# Patient Record
Sex: MALE | Race: WHITE | NOT HISPANIC OR LATINO | Employment: UNEMPLOYED | URBAN - METROPOLITAN AREA
[De-identification: names, ages, dates, MRNs, and addresses within clinical notes are randomized per-mention and may not be internally consistent; named-entity substitution may affect disease eponyms.]

---

## 2019-10-01 ENCOUNTER — APPOINTMENT (OUTPATIENT)
Dept: CT IMAGING | Facility: HOSPITAL | Age: 49
End: 2019-10-01

## 2019-10-01 ENCOUNTER — HOSPITAL ENCOUNTER (EMERGENCY)
Facility: HOSPITAL | Age: 49
Discharge: HOME | End: 2019-10-02
Attending: EMERGENCY MEDICINE | Admitting: EMERGENCY MEDICINE

## 2019-10-01 ENCOUNTER — APPOINTMENT (OUTPATIENT)
Dept: GENERAL RADIOLOGY | Facility: HOSPITAL | Age: 49
End: 2019-10-01

## 2019-10-01 DIAGNOSIS — F19.10 POLYSUBSTANCE ABUSE (HCC): ICD-10-CM

## 2019-10-01 DIAGNOSIS — Z86.79 HISTORY OF HYPERTENSION: ICD-10-CM

## 2019-10-01 DIAGNOSIS — R07.9 CHEST PAIN, UNSPECIFIED TYPE: ICD-10-CM

## 2019-10-01 DIAGNOSIS — F10.929 ALCOHOLIC INTOXICATION WITH COMPLICATION (HCC): Primary | ICD-10-CM

## 2019-10-01 DIAGNOSIS — E87.1 HYPONATREMIA: ICD-10-CM

## 2019-10-01 DIAGNOSIS — Z86.79 HISTORY OF CONGESTIVE HEART FAILURE: ICD-10-CM

## 2019-10-01 DIAGNOSIS — E87.6 HYPOKALEMIA: ICD-10-CM

## 2019-10-01 LAB
ALBUMIN SERPL-MCNC: 3.8 G/DL (ref 3.5–5.2)
ALBUMIN/GLOB SERPL: 0.8 G/DL
ALP SERPL-CCNC: 105 U/L (ref 39–117)
ALT SERPL W P-5'-P-CCNC: 24 U/L (ref 1–41)
AMPHET+METHAMPHET UR QL: NEGATIVE
AMPHETAMINES UR QL: NEGATIVE
ANION GAP SERPL CALCULATED.3IONS-SCNC: 20 MMOL/L (ref 5–15)
AST SERPL-CCNC: 30 U/L (ref 1–40)
BARBITURATES UR QL SCN: NEGATIVE
BASOPHILS # BLD AUTO: 0.05 10*3/MM3 (ref 0–0.2)
BASOPHILS NFR BLD AUTO: 0.5 % (ref 0–1.5)
BENZODIAZ UR QL SCN: NEGATIVE
BILIRUB SERPL-MCNC: 0.3 MG/DL (ref 0.2–1.2)
BILIRUB UR QL STRIP: NEGATIVE
BUN BLD-MCNC: 6 MG/DL (ref 6–20)
BUN BLDA-MCNC: 4 MG/DL (ref 8–26)
BUN/CREAT SERPL: 8.1 (ref 7–25)
BUPRENORPHINE SERPL-MCNC: NEGATIVE NG/ML
CA-I BLDA-SCNC: 1.05 MMOL/L (ref 1.2–1.32)
CALCIUM SPEC-SCNC: 9.2 MG/DL (ref 8.6–10.5)
CANNABINOIDS SERPL QL: NEGATIVE
CHLORIDE BLDA-SCNC: 88 MMOL/L (ref 98–109)
CHLORIDE SERPL-SCNC: 79 MMOL/L (ref 98–107)
CK SERPL-CCNC: 92 U/L (ref 20–200)
CLARITY UR: CLEAR
CO2 BLDA-SCNC: 36 MMOL/L (ref 24–29)
CO2 SERPL-SCNC: 28 MMOL/L (ref 22–29)
COCAINE UR QL: POSITIVE
COLOR UR: YELLOW
CREAT BLD-MCNC: 0.74 MG/DL (ref 0.76–1.27)
CREAT BLDA-MCNC: 0.7 MG/DL (ref 0.6–1.3)
DEPRECATED RDW RBC AUTO: 55.4 FL (ref 37–54)
EOSINOPHIL # BLD AUTO: 0.07 10*3/MM3 (ref 0–0.4)
EOSINOPHIL NFR BLD AUTO: 0.6 % (ref 0.3–6.2)
ERYTHROCYTE [DISTWIDTH] IN BLOOD BY AUTOMATED COUNT: 20.1 % (ref 12.3–15.4)
ETHANOL BLD-MCNC: 156 MG/DL (ref 0–10)
GFR SERPL CREATININE-BSD FRML MDRD: 112 ML/MIN/1.73
GLOBULIN UR ELPH-MCNC: 4.9 GM/DL
GLUCOSE BLD-MCNC: 134 MG/DL (ref 65–99)
GLUCOSE BLDC GLUCOMTR-MCNC: 100 MG/DL (ref 70–130)
GLUCOSE UR STRIP-MCNC: NEGATIVE MG/DL
HCT VFR BLD AUTO: 51 % (ref 37.5–51)
HCT VFR BLDA CALC: 52 % (ref 38–51)
HGB BLD-MCNC: 17.1 G/DL (ref 13–17.7)
HGB BLDA-MCNC: 17.7 G/DL (ref 12–17)
HGB UR QL STRIP.AUTO: NEGATIVE
HOLD SPECIMEN: NORMAL
HOLD SPECIMEN: NORMAL
IMM GRANULOCYTES # BLD AUTO: 0.04 10*3/MM3 (ref 0–0.05)
IMM GRANULOCYTES NFR BLD AUTO: 0.4 % (ref 0–0.5)
KETONES UR QL STRIP: NEGATIVE
LEUKOCYTE ESTERASE UR QL STRIP.AUTO: NEGATIVE
LIPASE SERPL-CCNC: 31 U/L (ref 13–60)
LYMPHOCYTES # BLD AUTO: 2.25 10*3/MM3 (ref 0.7–3.1)
LYMPHOCYTES NFR BLD AUTO: 20.3 % (ref 19.6–45.3)
MAGNESIUM SERPL-MCNC: 2.3 MG/DL (ref 1.6–2.6)
MCH RBC QN AUTO: 26.2 PG (ref 26.6–33)
MCHC RBC AUTO-ENTMCNC: 33.5 G/DL (ref 31.5–35.7)
MCV RBC AUTO: 78.2 FL (ref 79–97)
METHADONE UR QL SCN: NEGATIVE
MONOCYTES # BLD AUTO: 1 10*3/MM3 (ref 0.1–0.9)
MONOCYTES NFR BLD AUTO: 9 % (ref 5–12)
NEUTROPHILS # BLD AUTO: 7.66 10*3/MM3 (ref 1.7–7)
NEUTROPHILS NFR BLD AUTO: 69.2 % (ref 42.7–76)
NITRITE UR QL STRIP: NEGATIVE
NRBC BLD AUTO-RTO: 0 /100 WBC (ref 0–0.2)
NT-PROBNP SERPL-MCNC: 138.2 PG/ML (ref 5–450)
OPIATES UR QL: NEGATIVE
OXYCODONE UR QL SCN: NEGATIVE
PCP UR QL SCN: NEGATIVE
PH UR STRIP.AUTO: 6.5 [PH] (ref 5–8)
PLATELET # BLD AUTO: 331 10*3/MM3 (ref 140–450)
PMV BLD AUTO: 9.4 FL (ref 6–12)
POTASSIUM BLD-SCNC: 2.5 MMOL/L (ref 3.5–5.2)
POTASSIUM BLDA-SCNC: 3.9 MMOL/L (ref 3.5–4.9)
PROPOXYPH UR QL: NEGATIVE
PROT SERPL-MCNC: 8.7 G/DL (ref 6–8.5)
PROT UR QL STRIP: NEGATIVE
RBC # BLD AUTO: 6.52 10*6/MM3 (ref 4.14–5.8)
SALICYLATES SERPL-MCNC: <0.3 MG/DL
SODIUM BLD-SCNC: 127 MMOL/L (ref 136–145)
SODIUM BLDA-SCNC: 133 MMOL/L (ref 138–146)
SP GR UR STRIP: 1.01 (ref 1–1.03)
TRICYCLICS UR QL SCN: NEGATIVE
TROPONIN T SERPL-MCNC: <0.01 NG/ML (ref 0–0.03)
TROPONIN T SERPL-MCNC: <0.01 NG/ML (ref 0–0.03)
UROBILINOGEN UR QL STRIP: NORMAL
WBC NRBC COR # BLD: 11.07 10*3/MM3 (ref 3.4–10.8)
WHOLE BLOOD HOLD SPECIMEN: NORMAL
WHOLE BLOOD HOLD SPECIMEN: NORMAL

## 2019-10-01 PROCEDURE — 84484 ASSAY OF TROPONIN QUANT: CPT | Performed by: EMERGENCY MEDICINE

## 2019-10-01 PROCEDURE — 85025 COMPLETE CBC W/AUTO DIFF WBC: CPT

## 2019-10-01 PROCEDURE — 80047 BASIC METABLC PNL IONIZED CA: CPT

## 2019-10-01 PROCEDURE — 80307 DRUG TEST PRSMV CHEM ANLYZR: CPT | Performed by: NURSE PRACTITIONER

## 2019-10-01 PROCEDURE — 80053 COMPREHEN METABOLIC PANEL: CPT | Performed by: EMERGENCY MEDICINE

## 2019-10-01 PROCEDURE — 81003 URINALYSIS AUTO W/O SCOPE: CPT | Performed by: NURSE PRACTITIONER

## 2019-10-01 PROCEDURE — 96365 THER/PROPH/DIAG IV INF INIT: CPT

## 2019-10-01 PROCEDURE — 83690 ASSAY OF LIPASE: CPT | Performed by: NURSE PRACTITIONER

## 2019-10-01 PROCEDURE — 99284 EMERGENCY DEPT VISIT MOD MDM: CPT

## 2019-10-01 PROCEDURE — 93005 ELECTROCARDIOGRAM TRACING: CPT | Performed by: EMERGENCY MEDICINE

## 2019-10-01 PROCEDURE — 80307 DRUG TEST PRSMV CHEM ANLYZR: CPT | Performed by: EMERGENCY MEDICINE

## 2019-10-01 PROCEDURE — 93005 ELECTROCARDIOGRAM TRACING: CPT

## 2019-10-01 PROCEDURE — 85014 HEMATOCRIT: CPT

## 2019-10-01 PROCEDURE — 83735 ASSAY OF MAGNESIUM: CPT | Performed by: NURSE PRACTITIONER

## 2019-10-01 PROCEDURE — 71045 X-RAY EXAM CHEST 1 VIEW: CPT

## 2019-10-01 PROCEDURE — 83880 ASSAY OF NATRIURETIC PEPTIDE: CPT | Performed by: EMERGENCY MEDICINE

## 2019-10-01 PROCEDURE — 70450 CT HEAD/BRAIN W/O DYE: CPT

## 2019-10-01 PROCEDURE — 80306 DRUG TEST PRSMV INSTRMNT: CPT | Performed by: NURSE PRACTITIONER

## 2019-10-01 PROCEDURE — 25010000003 POTASSIUM CHLORIDE 10 MEQ/100ML SOLUTION: Performed by: NURSE PRACTITIONER

## 2019-10-01 PROCEDURE — 82550 ASSAY OF CK (CPK): CPT | Performed by: NURSE PRACTITIONER

## 2019-10-01 RX ORDER — POTASSIUM CHLORIDE 750 MG/1
40 CAPSULE, EXTENDED RELEASE ORAL ONCE
Status: COMPLETED | OUTPATIENT
Start: 2019-10-01 | End: 2019-10-01

## 2019-10-01 RX ORDER — POTASSIUM CHLORIDE 7.45 MG/ML
10 INJECTION INTRAVENOUS ONCE
Status: COMPLETED | OUTPATIENT
Start: 2019-10-01 | End: 2019-10-01

## 2019-10-01 RX ORDER — POTASSIUM CHLORIDE 1.5 G/1.77G
40 POWDER, FOR SOLUTION ORAL ONCE
Status: DISCONTINUED | OUTPATIENT
Start: 2019-10-01 | End: 2019-10-01

## 2019-10-01 RX ORDER — SODIUM CHLORIDE 0.9 % (FLUSH) 0.9 %
10 SYRINGE (ML) INJECTION AS NEEDED
Status: DISCONTINUED | OUTPATIENT
Start: 2019-10-01 | End: 2019-10-02 | Stop reason: HOSPADM

## 2019-10-01 RX ADMIN — POTASSIUM CHLORIDE 40 MEQ: 750 CAPSULE, EXTENDED RELEASE ORAL at 22:05

## 2019-10-01 RX ADMIN — SODIUM CHLORIDE 1000 ML: 9 INJECTION, SOLUTION INTRAVENOUS at 18:58

## 2019-10-01 RX ADMIN — POTASSIUM CHLORIDE 40 MEQ: 750 CAPSULE, EXTENDED RELEASE ORAL at 20:32

## 2019-10-01 RX ADMIN — POTASSIUM CHLORIDE 10 MEQ: 7.46 INJECTION, SOLUTION INTRAVENOUS at 18:58

## 2019-10-02 VITALS
OXYGEN SATURATION: 97 % | TEMPERATURE: 98.3 F | HEIGHT: 72 IN | HEART RATE: 76 BPM | WEIGHT: 315 LBS | BODY MASS INDEX: 42.66 KG/M2 | SYSTOLIC BLOOD PRESSURE: 132 MMHG | DIASTOLIC BLOOD PRESSURE: 75 MMHG | RESPIRATION RATE: 20 BRPM

## 2019-10-02 NOTE — DISCHARGE INSTRUCTIONS
Continue to take your medication as previously prescribed.  As we discussed, your sodium and potassium levels were initially significantly decreased.  Follow-up with your primary care physician in the near future to recheck these and confirm that they remain normal.  Follow-up with the [place], the [facility], and your primary care physician for further evaluation and management of your anxiety and substance abuse.  Return to the emergency department if symptoms worsen or other concerns arise.

## 2022-09-16 NOTE — ED PROVIDER NOTES
Spoke with patient, and notified him that we were following up to ensure he is taking the antibiotics, and understood these are to help with preventing/treating any wound infections he may have. Pt stated \"got it, will do, and understood\" to this message. No further questions at this time, and pt then hung up on this caller. ----- Message from Reginaldo Alford sent at 9/15/2022 12:17 PM CDT -----  Regarding: RE: Patient not wanting to take meds  Thanks Fidencio Main, appreciate it! Team- can we please call and make sure he's taking the antibiotics? Thanks!   ----- Message -----  From: Ciro Kuhn PT  Sent: 9/15/2022  11:15 AM CDT  To: TRICE Alford  Subject: Patient not wanting to take meds                 Hi Sincer,    Just wanted to give you a heads up that this patient voiced to me during our last session that he did not want to take the antibiotics you prescribed for his potential wound infection. I told him this was not a good idea and he should follow your recommendation to avoid any potential complications involved with the potential infection of his leg but not sure how well that was received. By the end of the session he begrudgingly agreed to take the antibiotics. That said, he has transferred his PT care to a location through his work he said - he messaged me the other day.     Just thought you should know about the wound and antibiotic convoFidencio Subjective   Virgil Preston is a 49 year old presenting to the ED today with complaints of chest pain. He states he has been very stressed lately and recently began drinking alcohol and snorting cocaine again. He states he has congestive heart failure and today began experiencing a tightness in his chest. He reports he has drank alcohol every day for the past 1 month and has drank 18 beers today already. He also reports he has been snorting cocaine for the past 1 month, but has not done so in the past 2 days. He has taken Suboxone since 2013, but this week he has begun to abuse this. He has plans to go to [facility], but was told to come here to be cleared for admittance first. He was hospitalized for bad fluid retention a few months ago and was diagnosed with congestive heart failure. He was hospitalized a few months ago for his congestive heart failure. He states he hasn't eaten in 3-4 days, but ate 2 sausage egg biscuits today. He reports falling 2 days ago, so his wife checked his oxygen level then and it was 87. He has oxygen at his home that he is suppose to use as needed but he has not been checking his oxygen levels recently. He states he is on diuretics. He has a history of hypertension however has not taken his medication the past few days. He denies a medical history of COPD, PE, DVT, MI, or pancreatitis. He denies any suicidal thoughts, homicidal thoughts, or hallucinations. He denies any use of heroin or marijuana. He states he has smoked for the past 30 years. He denies a history of being hospitalized for psychiatric, drug, or alcohol abuse previously. His cardiologist is Dr. Barahona. He states he has been on disability for about 1 year.         History provided by:  Patient  Chest Pain   Pain quality: pressure    Pain severity:  Moderate  Onset quality:  Gradual  Timing:  Constant  Progression:  Worsening  Chronicity:  New  Context: drug use    Associated symptoms: altered mental  status    Risk factors: hypertension and obesity        Review of Systems   Constitutional: Positive for appetite change.   Cardiovascular: Positive for chest pain.   Psychiatric/Behavioral: Negative for suicidal ideas.   All other systems reviewed and are negative.      No past medical history on file.    No Known Allergies    No past surgical history on file.    No family history on file.    Social History     Socioeconomic History   • Marital status:      Spouse name: Not on file   • Number of children: Not on file   • Years of education: Not on file   • Highest education level: Not on file         Objective   Physical Exam   Constitutional: He is oriented to person, place, and time. He appears well-developed and well-nourished. No distress.   Obese gentleman lying in supine position with no respiratory distress. He is corporative, oriented, but can't complete his sentences. His wife at bedside is a better historian.     HENT:   Head: Normocephalic and atraumatic.   Mouth/Throat: Mucous membranes are dry.   Mucous membranes are dry.   Eyes: Conjunctivae are normal. No scleral icterus.   Pupils are 3mm.   Neck: Normal range of motion. Neck supple. No JVD present.   Cardiovascular: Normal rate, regular rhythm and normal heart sounds.   No murmur heard.  No tachycardia.   Pulmonary/Chest: Effort normal and breath sounds normal. No respiratory distress. He has no rales.   No rales in the bases.    Abdominal: Soft. He exhibits distension. A hernia (umbilical hernia that is easily reduced and nontender) is present.   Musculoskeletal: Normal range of motion. He exhibits no edema.   Patient has scant peripheral edema.    Neurological: He is alert and oriented to person, place, and time. No sensory deficit.   No neurosensory deficits or focal weakness.   Skin: Skin is warm and dry.   Psychiatric:   Patient is not psychotic. He is corporative, cordial, anxious, and slightly intoxicated. He is unable to finished his  sentences, and depends on his wife for this.   Nursing note and vitals reviewed.      Procedures         ED Course  ED Course as of Oct 02 1317   Tue Oct 01, 2019   1742 Ethanol: (!) 156 [MS]   1742 Sodium: (!) 127 [MS]   1742 Potassium: (!!) 2.5 [MS]   1742 Chloride: (!) 79 [MS]   2202 [facility] representative has met with Mr. Preston. A nurse-to-nurse evaluation is pending before he is accepted .  [MS]   2302 Serum potassium is corrected.    Potassium: 3.9 [MS]   Wed Oct 02, 2019   0234 The   [CP]      ED Course User Index  [CP] Beto Abbott DO  [MS] Linda Arana, PASHA       Recent Results (from the past 24 hour(s))   Comprehensive Metabolic Panel    Collection Time: 10/01/19  4:26 PM   Result Value Ref Range    Glucose 134 (H) 65 - 99 mg/dL    BUN 6 6 - 20 mg/dL    Creatinine 0.74 (L) 0.76 - 1.27 mg/dL    Sodium 127 (L) 136 - 145 mmol/L    Potassium 2.5 (C) 3.5 - 5.2 mmol/L    Chloride 79 (L) 98 - 107 mmol/L    CO2 28.0 22.0 - 29.0 mmol/L    Calcium 9.2 8.6 - 10.5 mg/dL    Total Protein 8.7 (H) 6.0 - 8.5 g/dL    Albumin 3.80 3.50 - 5.20 g/dL    ALT (SGPT) 24 1 - 41 U/L    AST (SGOT) 30 1 - 40 U/L    Alkaline Phosphatase 105 39 - 117 U/L    Total Bilirubin 0.3 0.2 - 1.2 mg/dL    eGFR Non African Amer 112 >60 mL/min/1.73    Globulin 4.9 gm/dL    A/G Ratio 0.8 g/dL    BUN/Creatinine Ratio 8.1 7.0 - 25.0    Anion Gap 20.0 (H) 5.0 - 15.0 mmol/L   BNP    Collection Time: 10/01/19  4:26 PM   Result Value Ref Range    proBNP 138.2 5.0 - 450.0 pg/mL   Troponin    Collection Time: 10/01/19  4:26 PM   Result Value Ref Range    Troponin T <0.010 0.000 - 0.030 ng/mL   Ethanol    Collection Time: 10/01/19  4:26 PM   Result Value Ref Range    Ethanol 156 (H) 0 - 10 mg/dL   Light Blue Top    Collection Time: 10/01/19  4:26 PM   Result Value Ref Range    Extra Tube hold for add-on    Green Top (Gel)    Collection Time: 10/01/19  4:26 PM   Result Value Ref Range    Extra Tube Hold for add-ons.    Lavender Top    Collection  Time: 10/01/19  4:26 PM   Result Value Ref Range    Extra Tube hold for add-on    Gold Top - SST    Collection Time: 10/01/19  4:26 PM   Result Value Ref Range    Extra Tube Hold for add-ons.    CBC Auto Differential    Collection Time: 10/01/19  4:26 PM   Result Value Ref Range    WBC 11.07 (H) 3.40 - 10.80 10*3/mm3    RBC 6.52 (H) 4.14 - 5.80 10*6/mm3    Hemoglobin 17.1 13.0 - 17.7 g/dL    Hematocrit 51.0 37.5 - 51.0 %    MCV 78.2 (L) 79.0 - 97.0 fL    MCH 26.2 (L) 26.6 - 33.0 pg    MCHC 33.5 31.5 - 35.7 g/dL    RDW 20.1 (H) 12.3 - 15.4 %    RDW-SD 55.4 (H) 37.0 - 54.0 fl    MPV 9.4 6.0 - 12.0 fL    Platelets 331 140 - 450 10*3/mm3    Neutrophil % 69.2 42.7 - 76.0 %    Lymphocyte % 20.3 19.6 - 45.3 %    Monocyte % 9.0 5.0 - 12.0 %    Eosinophil % 0.6 0.3 - 6.2 %    Basophil % 0.5 0.0 - 1.5 %    Immature Grans % 0.4 0.0 - 0.5 %    Neutrophils, Absolute 7.66 (H) 1.70 - 7.00 10*3/mm3    Lymphocytes, Absolute 2.25 0.70 - 3.10 10*3/mm3    Monocytes, Absolute 1.00 (H) 0.10 - 0.90 10*3/mm3    Eosinophils, Absolute 0.07 0.00 - 0.40 10*3/mm3    Basophils, Absolute 0.05 0.00 - 0.20 10*3/mm3    Immature Grans, Absolute 0.04 0.00 - 0.05 10*3/mm3    nRBC 0.0 0.0 - 0.2 /100 WBC   Lipase    Collection Time: 10/01/19  4:26 PM   Result Value Ref Range    Lipase 31 13 - 60 U/L   Salicylate Level    Collection Time: 10/01/19  4:26 PM   Result Value Ref Range    Salicylate <0.3 <=30.0 mg/dL   CK    Collection Time: 10/01/19  4:26 PM   Result Value Ref Range    Creatine Kinase 92 20 - 200 U/L   Magnesium    Collection Time: 10/01/19  4:26 PM   Result Value Ref Range    Magnesium 2.3 1.6 - 2.6 mg/dL   Urinalysis With Microscopic If Indicated (No Culture) - Urine, Clean Catch    Collection Time: 10/01/19  6:30 PM   Result Value Ref Range    Color, UA Yellow Yellow, Straw    Appearance, UA Clear Clear    pH, UA 6.5 5.0 - 8.0    Specific Gravity, UA 1.007 1.001 - 1.030    Glucose, UA Negative Negative    Ketones, UA Negative Negative     Bilirubin, UA Negative Negative    Blood, UA Negative Negative    Protein, UA Negative Negative    Leuk Esterase, UA Negative Negative    Nitrite, UA Negative Negative    Urobilinogen, UA 0.2 E.U./dL 0.2 - 1.0 E.U./dL   Urine Drug Screen - Urine, Clean Catch    Collection Time: 10/01/19  6:30 PM   Result Value Ref Range    THC, Screen, Urine Negative Negative    Phencyclidine (PCP), Urine Negative Negative    Cocaine Screen, Urine Positive (A) Negative    Methamphetamine, Ur Negative Negative    Opiate Screen Negative Negative    Amphetamine Screen, Urine Negative Negative    Benzodiazepine Screen, Urine Negative Negative    Tricyclic Antidepressants Screen Negative Negative    Methadone Screen, Urine Negative Negative    Barbiturates Screen, Urine Negative Negative    Oxycodone Screen, Urine Negative Negative    Propoxyphene Screen Negative Negative    Buprenorphine, Screen, Urine Negative Negative   Troponin    Collection Time: 10/01/19  6:57 PM   Result Value Ref Range    Troponin T <0.010 0.000 - 0.030 ng/mL   POC CHEM 8    Collection Time: 10/01/19 10:54 PM   Result Value Ref Range    Glucose 100 70 - 130 mg/dL    BUN 4 (L) 8 - 26 mg/dL    Creatinine 0.70 0.60 - 1.30 mg/dL    Sodium 133 (L) 138 - 146 mmol/L    Potassium 3.9 3.5 - 4.9 mmol/L    Chloride 88 (L) 98 - 109 mmol/L    Total CO2 36 (H) 24 - 29 mmol/L    Hemoglobin 17.7 (H) 12.0 - 17.0 g/dL    Hematocrit 52 (H) 38 - 51 %    Ionized Calcium 1.05 (L) 1.20 - 1.32 mmol/L     Note: In addition to lab results from this visit, the labs listed above may include labs taken at another facility or during a different encounter within the last 24 hours. Please correlate lab times with ED admission and discharge times for further clarification of the services performed during this visit.    CT Head Without Contrast   Final Result   Normal unenhanced CT scan of the head.       D:  10/01/2019   E:  10/02/2019               This report was finalized on 10/2/2019 9:37 AM  "by Dr. Itz Lopez MD.          XR Chest 1 View   Final Result   Mild cardiomegaly with mild pulmonary vascular   congestion/mild interstitial edema suggested.       D:  10/01/2019   E:  10/01/2019           This report was finalized on 10/1/2019 4:59 PM by Dr. Jai Aggarwal MD.            Vitals:    10/01/19 1612 10/01/19 1859 10/01/19 2122 10/02/19 0126   BP: 157/92 135/72 142/69 132/75   BP Location: Left arm      Patient Position: Sitting      Pulse: 95 85 80 76   Resp: 18 17 16 20   Temp: 98 °F (36.7 °C)   98.3 °F (36.8 °C)   TempSrc: Oral      SpO2: 92% 95% 96% 97%   Weight: (!) 159 kg (350 lb)      Height: 182.9 cm (72\")        Medications   sodium chloride 0.9 % bolus 1,000 mL (0 mL Intravenous Stopped 10/1/19 2006)   potassium chloride 10 mEq in 100 mL IVPB (0 mEq Intravenous Stopped 10/1/19 2006)   potassium chloride (MICRO-K) CR capsule 40 mEq (40 mEq Oral Given 10/1/19 2032)   potassium chloride (MICRO-K) CR capsule 40 mEq (40 mEq Oral Given 10/1/19 2205)     ECG/EMG Results (last 24 hours)     Procedure Component Value Units Date/Time    ECG 12 Lead [[id number]] Collected:  10/01/19 1620     Updated:  10/01/19 1721        ECG 12 Lead         ECG 12 Lead                       [facility] did not accept the patient.  I hav    [facility]    Final diagnoses:   Alcoholic intoxication with complication (CMS/HCC)   Polysubstance abuse (CMS/HCC)   History of hypertension   History of congestive heart failure   Chest pain, unspecified type   Hypokalemia   Hyponatremia       Documentation assistance provided by ham Guerrero.  Information recorded by the scribe was done at my direction and has been verified and validated by me.     Alina Guerrero  10/01/19 1852       Linda Arana APRN  10/02/19 1317       Beto Abbott DO  10/02/19 1351    "

## 2023-08-02 ENCOUNTER — OFFICE VISIT (OUTPATIENT)
Dept: CARDIOLOGY | Facility: CLINIC | Age: 53
End: 2023-08-02
Payer: MEDICAID

## 2023-08-02 VITALS
SYSTOLIC BLOOD PRESSURE: 132 MMHG | BODY MASS INDEX: 44.1 KG/M2 | DIASTOLIC BLOOD PRESSURE: 74 MMHG | HEIGHT: 71 IN | OXYGEN SATURATION: 95 % | WEIGHT: 315 LBS | HEART RATE: 82 BPM

## 2023-08-02 DIAGNOSIS — I50.9 CHRONIC CONGESTIVE HEART FAILURE, UNSPECIFIED HEART FAILURE TYPE: ICD-10-CM

## 2023-08-02 DIAGNOSIS — G47.33 OSA (OBSTRUCTIVE SLEEP APNEA): ICD-10-CM

## 2023-08-02 RX ORDER — UMECLIDINIUM BROMIDE AND VILANTEROL TRIFENATATE 62.5; 25 UG/1; UG/1
POWDER RESPIRATORY (INHALATION) DAILY
COMMUNITY

## 2023-08-02 RX ORDER — FUROSEMIDE 40 MG/1
40 TABLET ORAL DAILY
COMMUNITY

## 2023-08-02 RX ORDER — BUPRENORPHINE HYDROCHLORIDE AND NALOXONE HYDROCHLORIDE DIHYDRATE 8; 2 MG/1; MG/1
1 TABLET SUBLINGUAL DAILY
COMMUNITY
Start: 2023-07-24

## 2023-08-02 RX ORDER — AMLODIPINE BESYLATE 10 MG/1
10 TABLET ORAL DAILY
COMMUNITY

## 2023-08-02 RX ORDER — FERROUS SULFATE 325(65) MG
325 TABLET ORAL
COMMUNITY
Start: 2023-07-10

## 2023-08-02 RX ORDER — DEXLANSOPRAZOLE 60 MG/1
60 CAPSULE, DELAYED RELEASE ORAL DAILY
COMMUNITY

## 2023-08-02 RX ORDER — SPIRONOLACTONE 25 MG/1
25 TABLET ORAL DAILY
COMMUNITY
Start: 2023-07-24

## 2023-08-02 RX ORDER — ALBUTEROL SULFATE 90 UG/1
2 AEROSOL, METERED RESPIRATORY (INHALATION) EVERY 4 HOURS PRN
COMMUNITY

## 2023-08-02 RX ORDER — ATENOLOL 100 MG/1
100 TABLET ORAL DAILY
COMMUNITY

## 2023-08-02 RX ORDER — IPRATROPIUM BROMIDE AND ALBUTEROL SULFATE 2.5; .5 MG/3ML; MG/3ML
3 SOLUTION RESPIRATORY (INHALATION) AS NEEDED
COMMUNITY

## 2023-08-02 RX ORDER — POTASSIUM CHLORIDE 20 MEQ/1
20 TABLET, EXTENDED RELEASE ORAL DAILY
COMMUNITY
Start: 2023-07-10

## 2023-08-02 RX ORDER — ALLOPURINOL 100 MG/1
100 TABLET ORAL DAILY
COMMUNITY
Start: 2023-06-15

## 2024-01-31 ENCOUNTER — OFFICE VISIT (OUTPATIENT)
Dept: CARDIOLOGY | Facility: CLINIC | Age: 54
End: 2024-01-31
Payer: MEDICAID

## 2024-01-31 VITALS
OXYGEN SATURATION: 92 % | BODY MASS INDEX: 44.1 KG/M2 | HEIGHT: 71 IN | SYSTOLIC BLOOD PRESSURE: 138 MMHG | HEART RATE: 76 BPM | WEIGHT: 315 LBS | DIASTOLIC BLOOD PRESSURE: 76 MMHG

## 2024-01-31 DIAGNOSIS — I50.32 CHRONIC DIASTOLIC HEART FAILURE: ICD-10-CM

## 2024-01-31 DIAGNOSIS — I10 ESSENTIAL HYPERTENSION: ICD-10-CM

## 2024-01-31 DIAGNOSIS — G47.33 OSA (OBSTRUCTIVE SLEEP APNEA): ICD-10-CM

## 2024-01-31 DIAGNOSIS — E78.2 MIXED HYPERLIPIDEMIA: ICD-10-CM

## 2024-01-31 DIAGNOSIS — R60.1 GENERALIZED EDEMA: ICD-10-CM

## 2024-01-31 PROBLEM — D50.9 MICROCYTIC ANEMIA: Status: ACTIVE | Noted: 2024-01-31

## 2024-01-31 PROBLEM — M54.30 SCIATICA: Status: ACTIVE | Noted: 2024-01-31

## 2024-01-31 PROBLEM — F41.9 ANXIETY DISORDER: Status: ACTIVE | Noted: 2024-01-31

## 2024-01-31 PROBLEM — E78.5 HYPERLIPIDEMIA: Status: ACTIVE | Noted: 2024-01-31

## 2024-01-31 PROBLEM — J98.11 ATELECTASIS: Status: ACTIVE | Noted: 2024-01-31

## 2024-01-31 PROBLEM — F10.20 ALCOHOL DEPENDENCE: Status: ACTIVE | Noted: 2024-01-31

## 2024-01-31 PROBLEM — N18.9 CHRONIC RENAL INSUFFICIENCY: Status: ACTIVE | Noted: 2024-01-31

## 2024-01-31 PROBLEM — K21.9 GASTROESOPHAGEAL REFLUX DISEASE WITHOUT ESOPHAGITIS: Status: ACTIVE | Noted: 2024-01-31

## 2024-01-31 PROBLEM — J44.9 CHRONIC OBSTRUCTIVE LUNG DISEASE: Status: ACTIVE | Noted: 2024-01-31

## 2024-01-31 PROBLEM — G89.29 CHRONIC PAIN: Status: ACTIVE | Noted: 2024-01-31

## 2024-01-31 PROBLEM — H90.3 SENSORINEURAL HEARING LOSS (SNHL) OF BOTH EARS: Status: ACTIVE | Noted: 2024-01-31

## 2024-01-31 PROBLEM — M19.90 OSTEOARTHRITIS: Status: ACTIVE | Noted: 2024-01-31

## 2024-01-31 PROBLEM — Q25.40 ABNORMALITY OF ABDOMINAL AORTA: Status: ACTIVE | Noted: 2024-01-31

## 2024-01-31 PROBLEM — K42.9 UMBILICAL HERNIA: Status: ACTIVE | Noted: 2024-01-31

## 2024-01-31 PROBLEM — Z91.199 NONCOMPLIANCE WITH TREATMENT: Status: ACTIVE | Noted: 2024-01-31

## 2024-01-31 PROBLEM — Z72.0 TOBACCO USER: Status: ACTIVE | Noted: 2024-01-31

## 2024-01-31 PROBLEM — M54.50 LOW BACK PAIN: Status: ACTIVE | Noted: 2024-01-31

## 2024-01-31 PROBLEM — E87.1 HYPONATREMIA: Status: ACTIVE | Noted: 2024-01-31

## 2024-01-31 PROBLEM — E66.01 MORBID OBESITY WITH BODY MASS INDEX (BMI) OF 40.0 OR HIGHER: Status: ACTIVE | Noted: 2024-01-31

## 2024-01-31 RX ORDER — PAROXETINE HYDROCHLORIDE 20 MG/1
20 TABLET, FILM COATED ORAL EVERY MORNING
COMMUNITY

## 2024-01-31 RX ORDER — ERGOCALCIFEROL 1.25 MG/1
CAPSULE ORAL
COMMUNITY
Start: 2023-10-09

## 2024-01-31 NOTE — PROGRESS NOTES
[facility] Provider Note     Date:   2024   Name: Virgil Preston Jr.  :   1970  PCP: Manuel Jones MD    Chief Complaint   Patient presents with    Follow-up     WITH ECHO    Sleep Apnea       Subjective     History of Present Illness  Virgil Preston Jr. is a 53 y.o. male who presents today for follow-up on ZAINAB and diastolic heart failure.  He is with his mother today, they live a few miles apart.  He denies any chest pain or SOA.  He has known COPD.  He also has an appt with PCP next week for annual with labs.    EKG updated today.    Reviewed preliminary echo results.    **Cardiology and Sleep Related Problem List**  1.) ZAINAB  2.) Diastolic HF- Grade 1  3.) HTN  4.) HLP  5.) Tobacco use/COPD  6.) Edema      1.) ZAINAB  -8/15/2019 sleep study-baseline AHI of 11; supine 14  -uses [facility] in [place]    2.) Diastolic HF  -23 normal EF, Grade 1 HFpEF  -on diuretics  -Edema    **Cardiology and Sleep Related Studies**    24 EKG - NSR    24 Echo-EF 56 to 60%, grade 1 diastolic dysfunction, right ventricular cavity is moderately dilated, left atrial cavity is moderately dilated, left atrial volume is moderately increased, mild dilation of the ascending aorta is present.  Lung disease noted.  Known COPD.    November or 2022 left heart cath-noted to have NCA with some LV dysfunction.    2022 nuclear stress test-septal portion associated with wall motion abnormalities and there is elevated transient ischemic dilation ratio which makes hemodynamically significant blockage much more likely.  High risk stress test.    8/15/2019 sleep study-baseline AHI of 11; supine 14    Allergies   Allergen Reactions    Bupropion Nausea Only and GI Intolerance    Cephalexin Nausea Only and GI Intolerance       Current Outpatient Medications:     albuterol sulfate  (90 Base) MCG/ACT inhaler, 2 puffs Every 4 (Four) Hours As Needed., Disp: , Rfl:      allopurinol (ZYLOPRIM) 100 MG tablet, Take 1 tablet by mouth Daily., Disp: , Rfl:     amLODIPine (NORVASC) 10 MG tablet, Take 1 tablet by mouth Daily., Disp: , Rfl:     Anoro Ellipta 62.5-25 MCG/ACT aerosol powder  inhaler, Inhale Daily., Disp: , Rfl:     atenolol (TENORMIN) 100 MG tablet, Take 1 tablet by mouth Daily., Disp: , Rfl:     buprenorphine-naloxone (SUBOXONE) 8-2 MG per SL tablet, Place 1 tablet under the tongue Daily., Disp: , Rfl:     dexlansoprazole (DEXILANT) 60 MG capsule, Take 1 capsule by mouth Daily., Disp: , Rfl:     FeroSul 325 (65 Fe) MG tablet, Take 1 tablet by mouth Daily With Breakfast., Disp: , Rfl:     furosemide (LASIX) 40 MG tablet, Take 1 tablet by mouth Daily., Disp: , Rfl:     ipratropium-albuterol (DUO-NEB) 0.5-2.5 mg/3 ml nebulizer, Inhale 3 mL As Needed., Disp: , Rfl:     PARoxetine (PAXIL) 20 MG tablet, Take 1 tablet by mouth Every Morning., Disp: , Rfl:     potassium chloride (K-DUR,KLOR-CON) 20 MEQ CR tablet, Take 1 tablet by mouth Daily., Disp: , Rfl:     spironolactone (ALDACTONE) 25 MG tablet, Take 1 tablet by mouth Daily., Disp: , Rfl:     vitamin D (ERGOCALCIFEROL) 1.25 MG (88559 UT) capsule capsule, , Disp: , Rfl:     Past Medical History:   Diagnosis Date    Hyperlipidemia 01/31/2024      Past Surgical History:   Procedure Laterality Date    CATARACT EXTRACTION Bilateral     TONSILLECTOMY      TOOTH EXTRACTION      three teeth    UMBILICAL HERNIA REPAIR       Family History   Problem Relation Age of Onset    Arthritis Mother     Lung cancer Father     Fibromyalgia Sister      Social History     Socioeconomic History    Marital status:     Number of children: 2   Tobacco Use    Smoking status: Every Day     Packs/day: 1.00     Years: 17.00     Additional pack years: 0.00     Total pack years: 17.00     Types: Cigarettes     Passive exposure: Current    Smokeless tobacco: Never   Vaping Use    Vaping Use: Never used   Substance and Sexual Activity    Alcohol use:  "Never    Drug use: Never    Sexual activity: Defer       Objective     Vital Signs:  /76   Pulse 76   Ht 180.3 cm (71\")   Wt (!) 165 kg (363 lb)   SpO2 92%   BMI 50.63 kg/m²   Estimated body mass index is 50.63 kg/m² as calculated from the following:    Height as of this encounter: 180.3 cm (71\").    Weight as of this encounter: 165 kg (363 lb).         Physical Exam  Vitals reviewed.   Constitutional:       Appearance: Normal appearance. He is well-developed. He is obese.   HENT:      Head: Normocephalic and atraumatic.   Eyes:      General: No scleral icterus.     Pupils: Pupils are equal, round, and reactive to light.   Neck:      Vascular: No carotid bruit.   Cardiovascular:      Rate and Rhythm: Normal rate and regular rhythm.      Pulses: Normal pulses.           Radial pulses are 2+ on the right side and 2+ on the left side.        Dorsalis pedis pulses are 2+ on the right side and 2+ on the left side.        Posterior tibial pulses are 2+ on the right side and 2+ on the left side.      Heart sounds: Normal heart sounds. No murmur heard.  Pulmonary:      Breath sounds: Normal breath sounds. No wheezing or rhonchi.   Musculoskeletal:      Right lower leg: Edema present.      Left lower leg: Edema present.   Skin:     Capillary Refill: Capillary refill takes less than 2 seconds.      Coloration: Skin is not cyanotic.      Nails: There is no clubbing.   Neurological:      Mental Status: He is alert and oriented to person, place, and time.      Motor: No weakness.      Gait: Gait abnormal.      Comments: walker   Psychiatric:         Mood and Affect: Mood normal.         Behavior: Behavior is cooperative.         Thought Content: Thought content normal.         Cognition and Memory: Memory normal.                 ECG 12 Lead    Date/Time: 1/31/2024 10:37 AM  Performed by: Marlena Arriaga APRN    Authorized by: Marlena Arriaga APRN  Comparison: compared with previous ECG from " 12/22/2022  Similar to previous ECG  Rhythm: sinus rhythm  BPM: 60    Clinical impression: normal ECG           Assessment and Plan     Diagnoses and all orders for this visit:    1. ZAINAB (obstructive sleep apnea)  Comments:  Does not have chip. Feels better with tx. Benefitting from pap therapy. Plan to continue.  Orders:  -     ECG 12 Lead  -     PAP Therapy    2. Essential hypertension  Comments:  At goal of less than 140/90.  EKG updated today. Continue plan of care.    3. Chronic diastolic heart failure  Comments:  Stable on echo today. Continue diuretics/K.    4. Mixed hyperlipidemia  Comments:  Getting updated labs next Monday with PCP. On statin.    5. Generalized edema  Comments:  Continue Lasix and potassium. Labs next week with PCP.        Recommendations: ER if symptoms increase, Report if any new/changing symptoms immediately, Limit salt, Elevate legs, Compression hose, Stop cigarettes, Limit caffeine, Exercise recommendations discussed, Sleep risks reviewed (driving, medical, sleep death, sedating agents), Sleep hygiene discussed, and Increase pap therapy usage      Follow Up  Return in about 6 months (around 7/31/2024) for ZAINAB/CHF.    Marlena Arriaga, APRN   01/31/2024     Please note that this explicitly excludes time spent on other separate billable services such as performing procedures or test interpretation, when applicable.    This note was created using dictation software which occasionally transcribes nonsensical phrases. Please contact the provider if any clarification is needed.

## 2024-08-02 ENCOUNTER — OFFICE VISIT (OUTPATIENT)
Dept: CARDIOLOGY | Facility: CLINIC | Age: 54
End: 2024-08-02
Payer: MEDICAID

## 2024-08-02 VITALS
OXYGEN SATURATION: 90 % | DIASTOLIC BLOOD PRESSURE: 82 MMHG | HEIGHT: 71 IN | SYSTOLIC BLOOD PRESSURE: 130 MMHG | WEIGHT: 315 LBS | HEART RATE: 65 BPM | BODY MASS INDEX: 44.1 KG/M2

## 2024-08-02 DIAGNOSIS — I50.32 CHRONIC DIASTOLIC HEART FAILURE: ICD-10-CM

## 2024-08-02 DIAGNOSIS — I10 ESSENTIAL HYPERTENSION: ICD-10-CM

## 2024-08-02 DIAGNOSIS — G47.33 OBSTRUCTIVE SLEEP APNEA SYNDROME: Primary | ICD-10-CM

## 2024-08-02 RX ORDER — UMECLIDINIUM BROMIDE AND VILANTEROL TRIFENATATE 62.5; 25 UG/1; UG/1
1 POWDER RESPIRATORY (INHALATION) DAILY
COMMUNITY
Start: 2024-07-09

## 2024-08-02 RX ORDER — GABAPENTIN 100 MG/1
100 CAPSULE ORAL
COMMUNITY
Start: 2024-04-18

## 2024-08-02 NOTE — ASSESSMENT & PLAN NOTE
He did not bring his CPAP chip in today so download unavailable for review.  He reports that he is compliant with PAP therapy and uses it every night.  He denies excessive daytime sleepiness or fatigue.  Instructed him to take device to [facility] to obtain download prior to next office visit.  - Continue PAP therapy at current settings  - Do not drive if you are feeling sleepy

## 2024-08-02 NOTE — PROGRESS NOTES
[facility] Provider Note     Date:   2024   Name: Virgil Preston Jr.  :   1970  PCP: Manuel Jones MD    Chief Complaint   Patient presents with    Congestive Heart Failure    Hypertension    Sleep Apnea     Didn't bring chip, no download.       Subjective     History of Present Illness  Virgil Preston Jr. is a 54 y.o. male who presents today for 6 month follow up visit on ZAINAB and diastolic heart failure.  Patient reports that he is doing very well overall.  He denies any current cardiac symptoms.  He denies chest pain, shortness of breath, palpitations, lower extremity edema, dizziness or syncope.  He has not had any ER visits or hospitalizations since his last office visit 6 months ago.  He did not bring his CPAP chip in today so download unavailable for review.  He reports that he is compliant with PAP therapy and uses it every night.  He uses a fullface mask and does well with that.  Airflow is comfortable.  He denies excessive daytime sleepiness or fatigue.  He has lost 33 pounds since his last office visit due to diet changes.  He has also been trying to walk more.  He is still using a walker but is able to get up more and walk than he was previously was.  He completed an echocardiogram on 2024 that revealed an LVEF of 56 to 60%, grade 1 diastolic dysfunction and elevated RVSP (45-55 mmHg).  He is scheduled to get routine labs with his PCP later this month.  Overall, he is doing well today with no new symptoms or concerns.    Cardiac History   1.) ZAINAB  2.) Diastolic HF- Grade 1  3.) HTN  4.) HLD  5.) Tobacco use/COPD  6.) Edema  7.) Moderate TR      1.) ZAINBA  -8/15/2019 sleep study-baseline AHI of 11; supine 14  -uses [facility] in [place]    2.) Diastolic HF  -23 normal EF, Grade 1 HFpEF  -on diuretics  -Edema    24 EKG - NSR    Echocardiogram 24  ·  Left ventricular systolic function is normal. Left ventricular ejection fraction appears  to be 56 - 60%.  ·  Left ventricular wall thickness is consistent with mild concentric hypertrophy.  ·  Left ventricular diastolic function is consistent with (grade I) impaired relaxation.  ·  Estimated right ventricular systolic pressure from tricuspid regurgitation is moderately elevated (45-55 mmHg).  ·  Mild dilation of the ascending aorta is present.       [facility] November or December 2022-noted to have NCA with some LV dysfunction.    11/9/2022 nuclear stress test-septal portion associated with wall motion abnormalities and there is elevated transient ischemic dilation ratio which makes hemodynamically significant blockage much more likely.  High risk stress test.    8/15/2019 sleep study-baseline AHI of 11; supine 14     Reports Denies   Chest Pain [] [x]   Shortness of Air [] [x]   Palpitations [] [x]   Edema [] [x]   Dizziness [] [x]   Syncope [] [x]         Allergies   Allergen Reactions    Bupropion Nausea Only and GI Intolerance    Cephalexin Nausea Only and GI Intolerance       Current Outpatient Medications:     albuterol sulfate  (90 Base) MCG/ACT inhaler, 2 puffs Every 4 (Four) Hours As Needed., Disp: , Rfl:     allopurinol (ZYLOPRIM) 100 MG tablet, Take 1 tablet by mouth Daily., Disp: , Rfl:     amLODIPine (NORVASC) 10 MG tablet, Take 1 tablet by mouth Daily., Disp: , Rfl:     Anoro Ellipta 62.5-25 MCG/ACT aerosol powder  inhaler, Inhale Daily., Disp: , Rfl:     atenolol (TENORMIN) 100 MG tablet, Take 1 tablet by mouth Daily., Disp: , Rfl:     buprenorphine-naloxone (SUBOXONE) 8-2 MG per SL tablet, Place 1 tablet under the tongue Daily., Disp: , Rfl:     dexlansoprazole (DEXILANT) 60 MG capsule, Take 1 capsule by mouth Daily., Disp: , Rfl:     FeroSul 325 (65 Fe) MG tablet, Take 1 tablet by mouth Daily With Breakfast., Disp: , Rfl:     furosemide (LASIX) 40 MG tablet, Take 1 tablet by mouth Daily., Disp: , Rfl:     gabapentin (NEURONTIN) 100 MG capsule, Take 1 capsule by mouth every night at  "bedtime., Disp: , Rfl:     ipratropium-albuterol (DUO-NEB) 0.5-2.5 mg/3 ml nebulizer, Inhale 3 mL As Needed., Disp: , Rfl:     PARoxetine (PAXIL) 20 MG tablet, Take 1 tablet by mouth Every Morning., Disp: , Rfl:     potassium chloride (K-DUR,KLOR-CON) 20 MEQ CR tablet, Take 1 tablet by mouth Daily., Disp: , Rfl:     spironolactone (ALDACTONE) 25 MG tablet, Take 1 tablet by mouth Daily., Disp: , Rfl:     Umeclidinium-Vilanterol (Anoro Ellipta) 62.5-25 MCG/ACT aerosol powder  inhaler, Inhale 1 puff Daily., Disp: , Rfl:     vitamin D (ERGOCALCIFEROL) 1.25 MG (55191 UT) capsule capsule, , Disp: , Rfl:     Past Medical History:   Diagnosis Date    Hyperlipidemia 01/31/2024      Past Surgical History:   Procedure Laterality Date    CATARACT EXTRACTION Bilateral     TONSILLECTOMY      TOOTH EXTRACTION      three teeth    UMBILICAL HERNIA REPAIR       Family History   Problem Relation Age of Onset    Arthritis Mother     Lung cancer Father     Fibromyalgia Sister      Social History     Socioeconomic History    Marital status:     Number of children: 2   Tobacco Use    Smoking status: Every Day     Current packs/day: 1.00     Average packs/day: 1 pack/day for 17.0 years (17.0 ttl pk-yrs)     Types: Cigarettes     Passive exposure: Current    Smokeless tobacco: Never   Vaping Use    Vaping status: Never Used   Substance and Sexual Activity    Alcohol use: Never    Drug use: Never    Sexual activity: Defer       Objective     Vital Signs:  /82   Pulse 65   Ht 180.3 cm (71\")   Wt (!) 150 kg (330 lb)   SpO2 90%   BMI 46.03 kg/m²   Estimated body mass index is 46.03 kg/m² as calculated from the following:    Height as of this encounter: 180.3 cm (71\").    Weight as of this encounter: 150 kg (330 lb).       Class 3 Severe Obesity (BMI >=40). Obesity-related health conditions include the following: obstructive sleep apnea and hypertension. Obesity is improving with lifestyle modifications. BMI is is above " average; BMI management plan is completed. We discussed low calorie, low carb based diet program, portion control, and increasing exercise.      Physical Exam  Vitals reviewed.   Constitutional:       Appearance: He is obese.   HENT:      Head: Normocephalic.   Cardiovascular:      Rate and Rhythm: Normal rate and regular rhythm.      Heart sounds: Normal heart sounds.   Pulmonary:      Effort: Pulmonary effort is normal.      Breath sounds: Normal breath sounds.   Musculoskeletal:      Right lower leg: No edema.      Left lower leg: No edema.   Skin:     General: Skin is warm and dry.      Capillary Refill: Capillary refill takes less than 2 seconds.   Neurological:      General: No focal deficit present.      Mental Status: He is alert and oriented to person, place, and time.   Psychiatric:         Mood and Affect: Mood normal.         Behavior: Behavior normal.                     Assessment and Plan     Diagnoses and all orders for this visit:    1. Obstructive sleep apnea syndrome (Primary)  Assessment & Plan:  He did not bring his CPAP chip in today so download unavailable for review.  He reports that he is compliant with PAP therapy and uses it every night.  He denies excessive daytime sleepiness or fatigue.  Instructed him to take device to [facility] to obtain download prior to next office visit.  - Continue PAP therapy at current settings  - Do not drive if you are feeling sleepy      2. Essential hypertension  Assessment & Plan:  Hypertension is stable and controlled  Continue current treatment regimen.  Dietary sodium restriction.  Weight loss.  Regular aerobic exercise.  Blood pressure will be reassessed in 6 months.      3. Chronic diastolic heart failure  Assessment & Plan:  Currently stable.  He completed an echocardiogram on 1/31/2024 that revealed an LVEF of 56 to 60%, grade 1 diastolic dysfunction and elevated RVSP (45-55 mmHg).  - Continue current medical therapy.          Recommendations:  Report if any new/changing symptoms immediately and Sleep risks reviewed (driving, medical, sleep death, sedating agents)          Follow Up  Return in about 6 months (around 2/2/2025) for ZAINAB, CHF.  Patient was given instructions and counseling regarding his condition or for health maintenance advice. Please see specific information pulled into the AVS if appropriate.

## 2024-08-02 NOTE — ASSESSMENT & PLAN NOTE
Currently stable.  He completed an echocardiogram on 1/31/2024 that revealed an LVEF of 56 to 60%, grade 1 diastolic dysfunction and elevated RVSP (45-55 mmHg).  - Continue current medical therapy.